# Patient Record
Sex: MALE | Race: WHITE
[De-identification: names, ages, dates, MRNs, and addresses within clinical notes are randomized per-mention and may not be internally consistent; named-entity substitution may affect disease eponyms.]

---

## 2019-03-11 ENCOUNTER — HOSPITAL ENCOUNTER (OUTPATIENT)
Dept: HOSPITAL 59 - SUR | Age: 64
Discharge: HOME | End: 2019-03-11
Attending: SURGERY
Payer: MEDICARE

## 2019-03-11 DIAGNOSIS — I10: ICD-10-CM

## 2019-03-11 DIAGNOSIS — K40.90: Primary | ICD-10-CM

## 2019-03-11 DIAGNOSIS — M10.9: ICD-10-CM

## 2019-03-11 PROCEDURE — 64425 NJX AA&/STRD II IH NERVES: CPT

## 2019-03-11 PROCEDURE — 00830 ANES HERNIA RPR LWR ABD NOS: CPT

## 2019-03-11 PROCEDURE — 76942 ECHO GUIDE FOR BIOPSY: CPT

## 2019-03-11 PROCEDURE — 49505 PRP I/HERN INIT REDUC >5 YR: CPT

## 2019-03-12 NOTE — OPERATIVE NOTE
DATE OF SURGERY: 03/11/2019 



Surgeon: Eliseo Rasmussen DO



PREOPERATIVE DIAGNOSIS: Reducible right inguinal hernia. 



POSTOPERATIVE DIAGNOSIS: Reducible right inguinal hernia, pantaloon hernia. 



OPERATION: Open right inguinal herniorrhaphy with mesh. 



Indication: The patient is a 63-year-old male who has had a long-standing right inguinal hernia. He 
states that he does do some power lifting and wanted to wait until now to get this repaired. Risks 
include but are not limited to bleeding, infection, acute or chronic pain, recurrence. He understood 
this fully. Thereafter, consent was signed and questions answered. 



PROCEDURE: He was taken to the operating room and placed in a supine position. General anesthesia 
was administered per the department of anesthesia. The patient right inguinal region was shaved of 
hair and prepped and draped in a sterile fashion. Adequate timeout was performed. He did receive 
preoperative antibiotics. He did undergo an inguinal block per the department of anesthesia. At this 
time, the oblique region was anesthetized with a total of 5 mL of 0.25% Sensorcaine with 
epinephrine. A 4 cm incision was made. This was carried down through copious amounts of subcutaneous 
tissue to where we encountered the hernia sac. The patient had a large hernia which was coming 
through the superficial inguinal ring. The external oblique aponeurosis essentially blown out, very 
thin in nature and almost indiscernible. At this time, the large inguinal hernia was reduced out of 
the scrotum. A small nick was made in the lateral aspect of the aponeurosis of external oblique. 
This was enlarged through the superficial inguinal ring. Superior and inferior flaps were developed. 
The Penrose was placed around the spermatic cord and retracted laterally. The patient had a large 
size direct hernia. The floor was imbricated with 0 Vicryl starting from the pubic tubercle to the 
deep inguinal ring. At this time, cremasteric fiber was taken down. The patient had a large indirect 
hernia sac as well as cord lipoma. High ligations of each were done. The patient's deep inguinal 
ring was extremely patulous. Therefore, a plug was used. An external plug was placed here and 
sutured in with 2-0 Vicryl. The deep inguinal ring was approximated as well. At this time, a 
right-sided ProGrip mesh was obtained. This was placed in the floor of the inguinal canal with 
excellent overlap of the pubic tubercle. Sutures went at the level of pubic tubercle, the second 
portion of the inguinal ligament, and the internal oblique aponeurosis. The lateral triangle was 
protected with the lateral aspect of the mesh. At this time, the aponeurosis was closed over the 
cord although this was very small in nature due to the size of the hernia. Danielle layer was closed 
with 3-0 Vicryl, and skin was closed with 4-0 Vicryl. The patient tolerated procedure well.



FINDINGS ON SURGERY: Right inguinal hernia, pantaloon, repaired as above. CC: MD ARPIT Tai